# Patient Record
Sex: MALE | Race: WHITE | NOT HISPANIC OR LATINO | URBAN - METROPOLITAN AREA
[De-identification: names, ages, dates, MRNs, and addresses within clinical notes are randomized per-mention and may not be internally consistent; named-entity substitution may affect disease eponyms.]

---

## 2022-03-07 ENCOUNTER — TELEMEDICINE (OUTPATIENT)
Dept: FAMILY MEDICINE CLINIC | Facility: TELEHEALTH | Age: 37
End: 2022-03-07

## 2022-03-07 DIAGNOSIS — M10.9 ACUTE GOUT INVOLVING TOE OF LEFT FOOT, UNSPECIFIED CAUSE: Primary | ICD-10-CM

## 2022-03-07 PROCEDURE — 99203 OFFICE O/P NEW LOW 30 MIN: CPT | Performed by: NURSE PRACTITIONER

## 2022-03-07 RX ORDER — COLCHICINE 0.6 MG/1
TABLET ORAL
Qty: 3 TABLET | Refills: 0 | Status: SHIPPED | OUTPATIENT
Start: 2022-03-07

## 2022-03-07 RX ORDER — ALLOPURINOL 100 MG/1
100 TABLET ORAL 2 TIMES DAILY
Qty: 60 TABLET | Refills: 0 | Status: SHIPPED | OUTPATIENT
Start: 2022-03-07

## 2022-03-07 NOTE — PROGRESS NOTES
You have chosen to receive care through a telehealth visit.  Do you consent to use a video/audio connection for your medical care today? Yes     CHIEF COMPLAINT  Chief Complaint   Patient presents with   • Gout         HPI  Maximino Campbell is a 36 y.o. male  presents with complaint of gout like pain of his left great toe and foot  that started on Friday and has worsened this morning. He states he has had similar pain in the past and it was treated with an antibiotic. He does not remember what it was. He reports no heat or swelling of his ankle or toe. He is taking IBU for pain which help some. He denies injury or trauma to his foot. He reports a diet high in pork and meats/protein recently.     Review of Systems   Constitutional: Positive for activity change (painful to walk). Negative for appetite change, chills, fatigue and fever.   Respiratory: Negative.    Cardiovascular: Negative.    Gastrointestinal: Negative.    Musculoskeletal: Positive for arthralgias (pain in left great toe and left ankle foot area).   Skin: Positive for color change (redness of left great toe and top of left foot).   Psychiatric/Behavioral: Negative.        History reviewed. No pertinent past medical history.    History reviewed. No pertinent family history.    Social History     Socioeconomic History   • Marital status:          There were no vitals taken for this visit.    PHYSICAL EXAM  Virtual Visit Physical Exam    No results found for this or any previous visit.    Diagnoses and all orders for this visit:    1. Acute gout involving toe of left foot, unspecified cause (Primary)  -     colchicine 0.6 MG tablet; Take 2 tabs po today then repeat one tab in 1 hour as needed for pain.  Dispense: 3 tablet; Refill: 0  -     allopurinol (Zyloprim) 100 MG tablet; Take 1 tablet by mouth 2 (Two) Times a Day. As needed for pain. Take with food.  Dispense: 60 tablet; Refill: 0    Keep left foot elevated and use ice prn   Stay off foot  tomorrow and keep elevated.   Follow up with PCP/[facility] for worsening s/s or for signs of infection or fever.       FOLLOW-UP  As discussed during visit with PCP/[facility] if no improvement or Urgent Care/Emergency Department if worsening of symptoms    Patient verbalizes understanding of medication dosage, comfort measures, instructions for treatment and follow-up.    Ly Baez, APRN  03/07/2022  17:14 EST    This visit was performed via Telehealth.  This patient has been instructed to follow-up with their primary care provider if their symptoms worsen or the treatment provided does not resolve their illness.